# Patient Record
Sex: MALE | NOT HISPANIC OR LATINO | Employment: UNEMPLOYED | URBAN - METROPOLITAN AREA
[De-identification: names, ages, dates, MRNs, and addresses within clinical notes are randomized per-mention and may not be internally consistent; named-entity substitution may affect disease eponyms.]

---

## 2024-06-19 ENCOUNTER — HOSPITAL ENCOUNTER (EMERGENCY)
Facility: CLINIC | Age: 39
Discharge: HOME | End: 2024-06-19
Attending: EMERGENCY MEDICINE | Admitting: EMERGENCY MEDICINE
Payer: MEDICAID

## 2024-06-19 DIAGNOSIS — F19.10 SUBSTANCE ABUSE (H): ICD-10-CM

## 2024-06-19 DIAGNOSIS — R45.1 AGITATION: ICD-10-CM

## 2024-06-19 DIAGNOSIS — F22 DELUSIONS (H): ICD-10-CM

## 2024-06-19 PROBLEM — F19.90 SUBSTANCE USE DISORDER: Status: ACTIVE | Noted: 2024-06-19

## 2024-06-19 PROBLEM — F90.9 ADHD (ATTENTION DEFICIT HYPERACTIVITY DISORDER): Status: ACTIVE | Noted: 2024-06-19

## 2024-06-19 PROCEDURE — 250N000009 HC RX 250: Performed by: EMERGENCY MEDICINE

## 2024-06-19 PROCEDURE — 250N000011 HC RX IP 250 OP 636: Mod: JZ | Performed by: EMERGENCY MEDICINE

## 2024-06-19 PROCEDURE — 96372 THER/PROPH/DIAG INJ SC/IM: CPT | Performed by: EMERGENCY MEDICINE

## 2024-06-19 PROCEDURE — 99285 EMERGENCY DEPT VISIT HI MDM: CPT

## 2024-06-19 RX ORDER — OLANZAPINE 10 MG/1
10 TABLET, ORALLY DISINTEGRATING ORAL 2 TIMES DAILY PRN
Status: DISCONTINUED | OUTPATIENT
Start: 2024-06-19 | End: 2024-06-19 | Stop reason: HOSPADM

## 2024-06-19 RX ORDER — WATER 10 ML/10ML
INJECTION INTRAMUSCULAR; INTRAVENOUS; SUBCUTANEOUS
Status: COMPLETED
Start: 2024-06-19 | End: 2024-06-19

## 2024-06-19 RX ORDER — OLANZAPINE 10 MG/2ML
10 INJECTION, POWDER, FOR SOLUTION INTRAMUSCULAR ONCE
Status: COMPLETED | OUTPATIENT
Start: 2024-06-19 | End: 2024-06-19

## 2024-06-19 RX ORDER — LORAZEPAM 1 MG/1
1 TABLET ORAL EVERY 8 HOURS PRN
Status: DISCONTINUED | OUTPATIENT
Start: 2024-06-19 | End: 2024-06-19 | Stop reason: HOSPADM

## 2024-06-19 RX ADMIN — OLANZAPINE 10 MG: 10 INJECTION, POWDER, FOR SOLUTION INTRAMUSCULAR at 03:10

## 2024-06-19 RX ADMIN — WATER 2.1 ML: 1 INJECTION INTRAMUSCULAR; INTRAVENOUS; SUBCUTANEOUS at 03:32

## 2024-06-19 ASSESSMENT — ACTIVITIES OF DAILY LIVING (ADL)
ADLS_ACUITY_SCORE: 35

## 2024-06-19 ASSESSMENT — COLUMBIA-SUICIDE SEVERITY RATING SCALE - C-SSRS
TOTAL  NUMBER OF ABORTED OR SELF INTERRUPTED ATTEMPTS LIFETIME: NO
2. HAVE YOU ACTUALLY HAD ANY THOUGHTS OF KILLING YOURSELF?: NO
IS THE PATIENT NOT ABLE TO COMPLETE C-SSRS: UNABLE TO VERBALIZE
6. HAVE YOU EVER DONE ANYTHING, STARTED TO DO ANYTHING, OR PREPARED TO DO ANYTHING TO END YOUR LIFE?: NO
TOTAL  NUMBER OF INTERRUPTED ATTEMPTS LIFETIME: NO
ATTEMPT LIFETIME: NO
1. HAVE YOU WISHED YOU WERE DEAD OR WISHED YOU COULD GO TO SLEEP AND NOT WAKE UP?: NO

## 2024-06-19 NOTE — ED NOTES
MRN: [id number]  [id number]    Paul Reyes is a 38 y.o. male who was admitted yesterday for agitation and delusions, with a history of substance abuse. He was brought into hospital yesterday and received IM xyproxide. He is doing better today, and is much calmer. He was seen by DEC today, and is now safe and stable for dc. He is able to be discharged. His only concern was that he was requesting more food prior to discharge.           Harrison Grady MD  06/19/24 2575

## 2024-06-19 NOTE — ED NOTES
Restraints removed. Patient agrees that he will not be a harm to himself or others. Patient has not displayed any aggressive behaviors since discontinuation of the restraints. Debriefing occurred with the patient after removal.

## 2024-06-19 NOTE — ED NOTES
Bed: BH3  Expected date:   Expected time:   Means of arrival:   Comments:  851 38m TYSHAWN kemp, restrained

## 2024-06-19 NOTE — ED TRIAGE NOTES
EMS arrival:    Patient picked up in [place].  Was in a parking lot.  Rambling incoherently.  Became aggressive with PD.  Rambling,  talking gibberish.  Rambling, squealing, not making any sense.  Arrives in restraints.    Unable to follow commands.  Delusional statements.  Sweating.  Under influence of unknown substance.    Talking about having 3 hearts. Making threats & talking about being dead.

## 2024-06-19 NOTE — ED NOTES
MRN: [id number]  [id number]  Paul Reyes is a 38 y.o. male who was admitted yesterday for agitation and delusions, with a history of substance abuse. He was brought into hospital yesterday and received Im zyproxide    He is doing better today, and is much calmer. He is rocking a bit upon exam.  He was seen by DEC today, and is now safe and stable. He is able to be discharged. His only concern was that he was requesting more food prior to discharge.

## 2024-06-19 NOTE — ED PROVIDER NOTES
"  Emergency Department Note      History of Present Illness     Chief Complaint  Agitation    HPI  Paul Reyes is a 38 year old male who presents to the emergency department via EMS after reports of bizarre behavior.  Patient was placed on [facility] transport hold after first responders felt that he was possibly under the influence of an unknown substance and was behaving in bizarre ways.  reported \"Paul was making delusional statements.  Paul was sweating heavily and is under the influence of an unknown substance.  Paul was making threats and talking about being dead.  Paul is unable to care for self.\"    Here in the emergency department the patient states \"I am a bio-immaculate being. 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  Patient with pressured speech making continued delusional statements.     Independent Historian  None    Review of External Notes  I reviewed the  hold that arrived with the patient as documented above.    Past Medical History   Medical History and Problem List  Past Medical History:   Diagnosis Date    ALCOHOL ABUSE-UNSPEC     ATTN DEFICIT W HYPERACT     DEPRESSIVE DISORDER NEC     LOWER LEG INJURY NOS        Medications  No current outpatient medications on file.      Surgical History   No past surgical history on file.  Physical Exam   Patient Vitals for the past 24 hrs:   BP Temp Temp src Pulse Resp SpO2   06/19/24 0500 135/69 -- -- 78 -- 94 %   06/19/24 0445 134/70 -- -- 97 -- 93 %   06/19/24 0430 136/70 -- -- 82 -- 91 %   06/19/24 0415 133/72 -- -- 77 -- 91 %   06/19/24 0400 -- -- -- -- -- 93 %   06/19/24 0345 135/77 -- -- 85 -- 95 %   06/19/24 0330 (!) 140/81 -- -- 82 -- 96 %   06/19/24 0320 (!) 140/92 -- " "-- -- -- 95 %   06/19/24 0309 -- -- -- -- 20 --   06/19/24 0300 139/86 -- -- 82 -- 90 %   06/19/24 0259 (!) 143/93 98.7  F (37.1  C) Temporal 89 20 94 %     Physical Exam  General: Nontoxic. Well appearing. In 5 point restraints.  Head:  Scalp, face, and head appear normal, atraumatic   Eyes:  Pupils are equal, round, reactive to light     Conjunctivae non-injected and sclerae white  ENT:    The external nose is normal    Pinnae are normal  Neck:  Normal range of motion    There is no rigidity noted    Trachea is in the midline  CV:  Regular rate and rhythm     Normal S1/S2, no S3/S4    No murmur or rub. Radial pulses 2+ bilaterally.  Resp:  Lungs are clear and equal bilaterally  There is no tachypnea    No increased work of breathing    No rales, wheezing, or rhonchi  GI:  Abdomen is soft, no rigidity or guarding    No distension, or mass    No tenderness or rebound tenderness   MS:  Normal muscular tone. Extremities appear atraumatic.     Symmetric motor strength    No lower extremity edema  Skin:  No rash or acute skin lesions noted  Neuro:  Awake and alert, oriented to person. Does not answer other orientation questions coherently. No facial droop.   Speech is normal and fluent  Moves all extremities spontaneously. SILT throughout  Psych: Bizzare affect. Endorses delusions of being a \"bioimmaculate being\" and states \"I am an accredited doctor.\" Does not respond coherently to questions about mood, mental health, suicidal ideation.  Patient does not appear to attend to internal stimuli.  Speech is pressured with flight of ideas and tangential thoughts.    Diagnostics   Lab Results   Labs Ordered and Resulted from Time of ED Arrival to Time of ED Departure - No data to display    Imaging  No orders to display         Independent Interpretation  None  ED Course    Medications Administered  Medications   OLANZapine zydis (zyPREXA) ODT tab 10 mg (has no administration in time range)   LORazepam (ATIVAN) tablet 1 mg " (has no administration in time range)   OLANZapine (zyPREXA) injection 10 mg (10 mg Intramuscular $Given 6/19/24 0310)   sterile water (preservative free) injection (2.1 mLs Intramuscular $Given 6/19/24 0338)       Procedures  Procedures     Discussion of Management  See below      Social Determinants of Health adding to complexity of care  None    ED Course  ED Course as of 06/19/24 0543   Wed Jun 19, 2024 0310 Patient seen and evaluated      Medical Decision Making / Diagnosis   CMS Diagnoses: None    MIPS     None    MDM  Paul Reyes is a 38 year old male who presents via EMS on a  hold due to bizarre behavior, delusional statements, and concern for substance use.  In review of prior records patient was hospitalized in the past for substance use related mental health issues.  He has a prior history of amphetamine use.  On my evaluation the patient appeared to have fixed delusions and psychosis.  Patient unable to provide a clear history of any suicidal ideation or homicidal ideation.  Patient arrived to the emergency department in restraints and these were initially continued.  He was treated with IM Zyprexa and able to be removed from restraints.  Patient placed on a 72-hour hold due to acute psychosis.  Patient denied any substance use to me other than marijuana although history is unreliable.  No evidence of any trauma.  Examination is otherwise benign.  Patient will require mental health evaluation.  As needed medications ordered for anxiety and agitation.  Patient was signed out to my partner pending DEC evaluation and ultimate disposition based on the results of this.    Disposition  Care of the patient was transferred to my colleague Dr. Grady pending DEC eval.     ICD-10 Codes:    ICD-10-CM    1. Delusions (H)  F22       2. Agitation  R45.1              MD Moshe Cobos, Paul Sánchez MD  06/19/24 3741

## 2024-06-19 NOTE — CONSULTS
"Diagnostic Evaluation Consultation  Crisis Assessment    Patient Name: Paul Reyes  Age:  38 year old  Legal Sex: male  Gender Identity: male  Pronouns:   Race: Choose not to Answer  Ethnicity: Not  or   Language: English      Patient was assessed: In person   Crisis Assessment Start Date: 06/19/24  Crisis Assessment Start Time: 1100  Crisis Assessment Stop Time: 1115  Patient location: [facility] EMERGENCY DEPT                             BH3    Referral Data and Chief Complaint  Paul Reyes presents to the ED via EMS. Patient is presenting to the ED for the following concerns: Significant behavioral change.   Factors that make the mental health crisis life threatening or complex are:  Pt presents to the ED via EMS from a [place] parking lot after being observed in \"bizarre behaviors.\" Pt reports he was singing, minding his own business when the police showed up, told him he was \"crazy\" and \"threw me into the car and brought here.\" Writer informed Pt police report reports concern of being picked up for acting peculiar in public and Pt responded with the police are out to get him. When prompted by Writer, Pt denies any SI, HI, AH/VH. Pt does endorse recent marijuana use. When prompted by Writer to discuss additional goals while in the hospital, Pt denies any and reports he would like to discharge. Pt did share frustration of being in [place] and having to somehow get back to [place].      Informed Consent and Assessment Methods  Explained the crisis assessment process, including applicable information disclosures and limits to confidentiality, assessed understanding of the process, and obtained consent to proceed with the assessment.  Assessment methods included conducting a formal interview with patient, review of medical records, collaboration with medical staff, and obtaining relevant collateral information from family and community providers when available.  : " "done     Patient response to interventions: acceptance expressed, verbalizes understanding  Coping skills were attempted to reduce the crisis:  NA     History of the Crisis   Current crisis reportedly started on 6/18/2024 when Pt was meeting with his mother and reported to be having \"word salad\" which collateral reports is indicative of recent substance use.    Brief Psychosocial History  Family:  Single, Children no  Support System:  Parent(s)  Employment Status:  unemployed  Source of Income:  social security  Financial Environmental Concerns:  none  Current Hobbies:  outdoor activities  Barriers in Personal Life:  mental health concerns    Significant Clinical History  Current Anxiety Symptoms:     Current Depression/Trauma:     Current Somatic Symptoms:     Current Psychosis/Thought Disturbance:     Current Eating Symptoms:     Chemical Use History:  Alcohol: None  Benzodiazepines: None  Opiates: None  Cocaine: None  Marijuana: Occasional  Other Use: Other (comments) (history of meth use, denies recent use.)   Past diagnosis:  ADHD, Substance Use Disorder  Family history:  No known history of mental health or chemical health concerns  Past treatment:  Inpatient Hospitalization  Details of most recent treatment:  It is reported Pt has a  through the [facility] for his subsidized housing and works with [employer] which is a volunteer program in [place], [place]  Other relevant history:  Pt was found incompetent to stand trial in October 2023.       Collateral Information  Is there collateral information: Yes     Collateral information name, relationship, phone number:  Jenna, mother, [phone]    What happened today: She shared meeting with Pt yesterday and reports Pt was displaying \"word salad\" which typically occurs frequently after substance use or when he is coming off a substance.     What is different about patient's functioning: She shared Pt also had an alcoholic beverage at lunch which was " "unusual for Pt.     Concern about alcohol/drug use:  history of meth use yet believes he may only be using marijuana. Identifies concern that marijuana use at times will induce a \"psychosis.\"    What do you think the patient needs: acknowledging his mental health concerns and follow up with outpatient providers.    Has patient made comments about wanting to kill themselves/others: no    If d/c is recommended, can they take part in safety/aftercare planning:  yes    Additional collateral information:  Pt is working with a [employer] through [employer], a "[employer]" in [place].     Risk Assessment  [place] Suicide Severity Rating Scale Full Clinical Version:  Suicidal Ideation  Q6 Suicide Behavior (Lifetime): no     Suicidal Behavior (Lifetime)  Actual Attempt (Lifetime): No  Has subject engaged in non-suicidal self-injurious behavior? (Lifetime): No  Interrupted Attempts (Lifetime): No  Aborted or Self-Interrupted Attempt (Lifetime): No  Preparatory Acts or Behavior (Lifetime): No    [place] Suicide Severity Rating Scale Recent:   Suicidal Ideation (Recent)  Q1 Wished to be Dead (Past Month): no  Q2 Suicidal Thoughts (Past Month): no  Level of Risk per Screen: no risks indicated          Environmental or Psychosocial Events: unemployment/underemployment, neither working nor attending school, other (see comment) (concern of recent substance use)  Protective Factors: Protective Factors: strong bond to family unit, community support, or employment, lives in a responsibly safe and stable environment, help seeking, optimistic outlook - identification of future goals    Does the patient have thoughts of harming others? Feels Like Hurting Others: no  Previous Attempt to Hurt Others: no  Is the patient engaging in sexually inappropriate behavior?: no    Is the patient engaging in sexually inappropriate behavior?  no        Mental Status Exam   Affect: Blunted  Appearance: Appropriate  Attention " "Span/Concentration: Attentive  Eye Contact: Engaged    Fund of Knowledge: Appropriate   Language /Speech Content: Fluent  Language /Speech Volume: Normal  Language /Speech Rate/Productions: Normal  Recent Memory: Intact  Remote Memory: Intact  Mood: Normal  Orientation to Person: Yes   Orientation to Place: Yes  Orientation to Time of Day: Yes  Orientation to Date: Yes     Situation (Do they understand why they are here?): Yes  Psychomotor Behavior: Normal  Thought Content: Clear  Thought Form: Intact       Medication  Psychotropic medications:   Medication Orders - Psychiatric (From admission, onward)      Start     Dose/Rate Route Frequency Ordered Stop    06/19/24 0336  LORazepam (ATIVAN) tablet 1 mg         1 mg Oral EVERY 8 HOURS PRN 06/19/24 0336      06/19/24 0336  OLANZapine zydis (zyPREXA) ODT tab 10 mg         10 mg Oral 2 TIMES DAILY PRN 06/19/24 0336               Current Care Team  No care team member to display    Diagnosis  Patient Active Problem List   Diagnosis Code    ADHD (attention deficit hyperactivity disorder) F90.9    Substance use disorder F19.90       Primary Problem This Admission  Active Hospital Problems    ADHD (attention deficit hyperactivity disorder)      Substance use disorder        Clinical Summary and Substantiation of Recommendations     Pt presents to the ED via EMS from a [employer] parking lot after being observed in \"bizarre behaviors.\" Pt reports he was singing, minding his own business when the police showed up, told him he was \"crazy\" and \"threw me into the car and brought here.\" Writer informed Pt police report reports concern of being picked up for acting peculiar in public and Pt responded with the police are out to get him. When prompted by Writer, Pt denies any SI, HI, AH/VH. Pt does endorse recent marijuana use. When prompted by Writer to discuss additional goals while in the hospital, Pt denies any and reports he would like to discharge. Pt did share frustration of " being in [place] and having to somehow get back to [place].    At this time IP MH admission is not being recommended due to denial of SI, HI, AH/VH and no overt displays of psychosis. Pt was able to fully safety plan with writer. Pt's current presentation appears to be chronic in nature and Pt's current sx appear to be at Pt's baseline. Pt does appear to be at higher risk of death by suicide accidental or intentional due to mental health hx and substance use sx.  At this time IP MH admission does not appear to be the most therapeutically beneficial intervention/ level of care for Pt. Pt appears to be able to engage in supportive mental health/ community resources.       Patient coping skills attempted to reduce the crisis:  NA    Disposition  Recommended disposition: Individual Therapy, Medication Management, Other. please comment (discharge)        Reviewed case and recommendations with attending provider. Attending Name: Dr. Grady       Attending concurs with disposition: yes       Patient and/or validated legal guardian concurs with disposition:   yes (Pt declines scheduling any services at this time)       Final disposition:  discharge    Legal status on admission: 72 Hour Hold    Assessment Details   Total duration spent with the patient: 15 min     CPT code(s) utilized: Non-Billable    TOD Kitchen, Psychotherapist  DEC - Triage & Transition Services  Callback: [phone]

## 2024-06-25 VITALS
OXYGEN SATURATION: 93 % | DIASTOLIC BLOOD PRESSURE: 70 MMHG | HEART RATE: 69 BPM | TEMPERATURE: 98.7 F | SYSTOLIC BLOOD PRESSURE: 136 MMHG | RESPIRATION RATE: 20 BRPM